# Patient Record
Sex: MALE | Race: WHITE
[De-identification: names, ages, dates, MRNs, and addresses within clinical notes are randomized per-mention and may not be internally consistent; named-entity substitution may affect disease eponyms.]

---

## 2014-03-14 VITALS — SYSTOLIC BLOOD PRESSURE: 152 MMHG | DIASTOLIC BLOOD PRESSURE: 89 MMHG

## 2018-04-24 ENCOUNTER — HOSPITAL ENCOUNTER (OUTPATIENT)
Dept: HOSPITAL 44 - RAD | Age: 68
End: 2018-04-24
Attending: FAMILY MEDICINE
Payer: MEDICARE

## 2018-04-24 DIAGNOSIS — M79.641: Primary | ICD-10-CM

## 2018-04-24 DIAGNOSIS — M79.642: ICD-10-CM

## 2018-04-24 PROCEDURE — 73130 X-RAY EXAM OF HAND: CPT

## 2018-04-24 NOTE — DIAGNOSTIC IMAGING REPORT
GARY DOWLING 

[facility]

[address] [facility] [address] [address]

 

 

 

 

Report Submission Date: 2018 11:28:21 AM CDT

Patient       Study

Name:   VELVET ARVIZU       Date:   2018 10:48:07 AM CDT

MRN:   [id number]       Modality Type:   DX

Gender:   M       Description:   UPPER EXTREMITY

:   46       Institution:   [facility]

Physician:   GARY DOWLING

     Accession:    [id number]

 

 

Examination: Plain film right hand 



History: PT C/O PAIN AT THE DISTAL END OF THE METACARPALS IN BOTH HANDS X 1 
WEEK. PT STATES HE HAD SIMILAR PAIN IN THE HANDS 3 MONTHS AGO (Hx) 



Comparison exams: None available 



Findings: 3 views the right hand demonstrates osteopenia. Articular 
degenerative changes. No fracture.  No dislocation. No soft tissue abnormality. 



Impression: Osteopenia and degenerative changes. No acute appearing osseous 
abnormality.

 

Electronically signed on 2018 11:28:21 AM CDT by:

Seun RAMIREZ

## 2018-04-24 NOTE — DIAGNOSTIC IMAGING REPORT
GARY DOWLING 

[facility]

[address] [facility] [address] [address]

 

 

 

 

Report Submission Date: 2018 11:27:53 AM CDT

Patient       Study

Name:   VELVET ARVIZU       Date:   2018 10:58:06 AM CDT

MRN:   [id number]       Modality Type:   DX

Gender:   M       Description:   UPPER EXTREMITY

:   46       Institution:   [facility]

Physician:   GARY DOWLING

     Accession:    [id number]

 

 

Examination: Plain film left hand 



History: PT C/O PAIN AT THE DISTAL END OF THE METACARPALS IN BOTH HANDS X 1 
WEEK. PT STATES HE HAD SIMILAR PAIN IN THE HANDS 3 MONTHS AGO (Hx) 



Comparison exams: None available 



Findings: 3 views the left hand demonstrates osteopenia. Articular degenerative 
changes. No fracture.  No dislocation. No soft tissue abnormality. 



Impression: Osteopenia and degenerative changes. No acute appearing osseous 
abnormality.

 

Electronically signed on 2018 11:27:53 AM CDT by:

Seun RAMIREZ

## 2018-05-15 ENCOUNTER — HOSPITAL ENCOUNTER (EMERGENCY)
Dept: HOSPITAL 44 - ED | Age: 68
Discharge: HOME | End: 2018-05-15
Payer: MEDICARE

## 2018-05-15 VITALS — DIASTOLIC BLOOD PRESSURE: 100 MMHG | SYSTOLIC BLOOD PRESSURE: 161 MMHG
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
## 2018-05-15 DIAGNOSIS — T16.2XXA: Primary | ICD-10-CM
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
## 2018-05-15 PROCEDURE — 99282 EMERGENCY DEPT VISIT SF MDM: CPT

## 2018-05-15 NOTE — ED PHYSICIAN DOCUMENTATION
Ear Complaints





- HISTORIAN


Historian: patient





- HPI


Stated Complaint: Bug in ear


Chief Complaint: Ear Complaints


Additional Information: 





Feels bug in left ear. Noticed just prior to coming to ER. No treatment 

attempted. 





- ROS


CONST: no problems





- PAST HX


Past History: other (hearing aid)


Allergies/Adverse Reactions: 


 Allergies











Allergy/AdvReac Type Severity Reaction Status Date / Time


 


oxycodone [Oxycodone] Allergy  Itchy Skin Verified 05/15/18 21:59














Home Medications: 


 Ambulatory Orders











 Medication  Instructions  Recorded


 


Alfuzosin HCl [Uroxatral] 10 mg PO D 03/14/14


 


B Cmplx 4/Vit D3/C/Folic/Zinc 1 tab PO D 03/14/14





[Vital-D Rx Tablet]  


 


Bupropion HCl [Wellbutrin Xl] 300 mg PO D 03/14/14


 


HYDROcodone /APAP 5/325 [Vicodin 1 tab PO PRN PRN 03/14/14





5/325]  


 


Lubiprostone [Amitiza] 8 mcg PO DAILY 03/14/14














- SOCIAL HX


Smoking History: non-smoker





- FAMILY HX


Family History: No





- VITAL SIGNS


Vital Signs: 





 Vital Signs











Temp Pulse Resp BP Pulse Ox


 


          152/89    


 


          03/14/14 01:26   














- REVIEWED ASSESSMENTS


Nursing Assessment  Reviewed: Yes


Vitals Reviewed: Yes





Progress





- Progress


Progress: 





Ear flushed with warm water per RN with return < 2mm in length black bug. 





ED Results Lab/Radiology





- Orders


Orders: 





 ED Orders











 Category Date Time Status


 


 Further Nursing Orders 1T Care  05/15/18 21:38 Active














Ear Complaint Physical Exam





- EXAM


General Appearance: alert, mild distress


Ear: auricle nml, extern.canal nml, left, other (cerumen noted in L ear and TM 

with scratch. Bug not visualized. )


Mouth/Throat: lips nml


Head/Neck: atraumatic


Eye: eyes nml inspection


Resp/CVS: no resp. distress


Neuro/Psych: mood/affect nml





Discharge


Clincal Impression: 


Foreign body in left ear


Qualifiers:


 Encounter type: initial encounter Qualified Code(s): T16.2XXA - Foreign body 

in left ear, initial encounter





Referrals: 


Calvin Serrano MD [Primary Care Provider] - 2 Days


Condition: Good


Disposition: 01 HOME, SELF-CARE


Decision to Admit: NO


Decision Time: 22:05

## 2019-03-29 ENCOUNTER — HOSPITAL ENCOUNTER (EMERGENCY)
Dept: HOSPITAL 44 - ED | Age: 69
Discharge: HOME | End: 2019-03-29
Payer: MEDICARE

## 2019-03-29 VITALS — DIASTOLIC BLOOD PRESSURE: 95 MMHG | SYSTOLIC BLOOD PRESSURE: 171 MMHG
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
## 2019-03-29 DIAGNOSIS — S92.515A: Primary | ICD-10-CM
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
## 2019-03-29 PROCEDURE — 99283 EMERGENCY DEPT VISIT LOW MDM: CPT

## 2019-03-29 PROCEDURE — 73630 X-RAY EXAM OF FOOT: CPT

## 2019-03-29 PROCEDURE — 99282 EMERGENCY DEPT VISIT SF MDM: CPT

## 2019-03-29 NOTE — ED PHYSICIAN DOCUMENTATION
Foot Injury





- HISTORIAN


Historian: patient





- HPI


Chief Complaint: Foot Injury (4th toe (left foot))


Additional Information: 


Patient is a 68-year-old male that presents to the ER with c/o left 4th toe 

injury.  States that he stubbed his toe approximately one hour ago on a chair.  


Onset: hours (one hour ago)


Where: home


Severity: mild


Context: other (stubbed toe on chair)


Associated Symptoms:: snapping sensation


Modifying Factors:: pain on movement


Further Comments: no





- ROS


CONST: no problems


CVS/RESP: none


NEURO: denies: headache


GI/: denies: nausea, vomiting


MS/SKIN/LYMPH: foot swelling (left foot discomfort)





- PAST HX


Past History: other (HTN, thyroid, depression, GERD, HLD)


Immunizations: UTD


Allergies/Adverse Reactions: 


                                    Allergies











Allergy/AdvReac Type Severity Reaction Status Date / Time


 


oxycodone [Oxycodone] Allergy  Itchy Skin Verified 05/15/18 21:59














Home Medications: 


                                Ambulatory Orders











 Medication  Instructions  Recorded


 


Alfuzosin HCl [Uroxatral] 10 mg PO D 03/14/14


 


B Cmplx 4/Vit D3/C/Folic/Zinc 1 tab PO D 03/14/14





[Vital-D Rx Tablet]  


 


Bupropion HCl [Wellbutrin Xl] 300 mg PO D 03/14/14


 


HYDROcodone /APAP 5/325 [Vicodin 1 tab PO PRN PRN 03/14/14





5/325]  


 


Lubiprostone [Amitiza] 8 mcg PO DAILY 03/14/14














- SOCIAL HX


Smoking History: non-smoker


Alcohol Use: none


Drug Use: none





- FAMILY HX


Family History: none





- VITAL SIGNS


Vital Signs: 





                                   Vital Signs











Temp Pulse Resp BP Pulse Ox


 


          161/100    


 


          05/15/18 22:12   














ED Results Lab/Radiology





- Orders


Orders: 





                                    ED Orders











 Category Date Time Status


 


 FOOT 3 VIEWS OR MORE [RAD] Stat Exams  03/29/19 Ordered














Foot Injury Physical Exam





- Physical Exam


General Appearance: no acute distress, alert


Foot: left foot: normal range of motion, bone tenderness, pain


Ankle: bilateral: non-tender, normal inspection, normal range of motion, no 

evidence of injury


Gait: normal


Neuro: sensation nml, motor nml


Vascular: no vascular compromise


Tendons: tendon function nml


Leg/Knee/Thigh: uninjured above ankle


Skin: intact, warm


Head/ENT: nml inspection


Neck/Back: nml inspection


Resp/CVS: breath sounds nml, heart sounds nml





Discharge


Clincal Impression: 


 Toe fracture, left





Clincal Impression: 


 (Ruled Out): Injury of toe on left foot


Referrals: 


Calvin Serrano MD [Primary Care Provider] - 2 Days


Additional Instructions: 


Left 4th toe injury with fracture


Buddy tape to the next toe


Post op shoe


Ice, Elevate


Alternate Tylenol and Ibuprofen for discomfort


Follow up with PCP as needed


Condition: Good


Disposition: 01 HOME, SELF-CARE


Decision to Admit: NO


Decision Time: 23:02

## 2019-03-30 NOTE — DIAGNOSTIC IMAGING REPORT
RYAN ALAN 

[facility]

[address] [facility].[address] [address]

 

 

 

 

Report Submission Date: Mar 29, 2019 11:08:07 PM CDT

Patient       Study

Name:   VELVET ARVIZU       Date:   Mar 29, 2019 10:46:08 PM CDT

MRN:   [id number]       Modality Type:   DX

Gender:   M       Description:   FOOT 3 VIEWS OR MORE

:   46       Institution:   [facility]

Physician:   RYAN ALAN

     Accession:    [id number]

 

 

Left foot three views 



History:  4th toe pain and injury. 



Findings:  A nondisplaced oblique fracture extends from the mid 4th proximal 
phalangeal shaft into the proximal phalangeal head.  The remaining left foot is 
intact.

 

Electronically signed on Mar 29, 2019 11:08:07 PM CDT by:

Edison RAMIREZ